# Patient Record
Sex: MALE | Race: WHITE | NOT HISPANIC OR LATINO | URBAN - METROPOLITAN AREA
[De-identification: names, ages, dates, MRNs, and addresses within clinical notes are randomized per-mention and may not be internally consistent; named-entity substitution may affect disease eponyms.]

---

## 2021-02-19 ENCOUNTER — PREPPED CHART (OUTPATIENT)
Dept: URBAN - METROPOLITAN AREA CLINIC 64 | Facility: CLINIC | Age: 61
End: 2021-02-19

## 2021-02-19 PROBLEM — H44.23 PROGRESSIVE HIGH MYOPIA: Noted: 2021-02-19

## 2021-02-19 PROBLEM — H43.813 POSTERIOR VITREOUS DETACHMENT: Noted: 2021-02-19

## 2021-02-19 PROBLEM — H43.12 VITREOUS HEMORRHAGE: Noted: 2021-02-19

## 2021-02-19 PROBLEM — H31.091 CHORIORETINAL SCARS, PERIPHERAL: Noted: 2021-02-19

## 2021-12-26 ENCOUNTER — NURSE TRIAGE (OUTPATIENT)
Dept: FAMILY MEDICINE | Facility: CLINIC | Age: 61
End: 2021-12-26

## 2021-12-26 ENCOUNTER — APPOINTMENT (OUTPATIENT)
Dept: LAB | Facility: URGENT CARE | Age: 61
End: 2021-12-26

## 2021-12-26 DIAGNOSIS — Z20.822 CONTACT WITH AND (SUSPECTED) EXPOSURE TO COVID-19: ICD-10-CM

## 2021-12-26 PROCEDURE — 87635 SARS-COV-2 COVID-19 AMP PRB: CPT | Performed by: FAMILY MEDICINE

## 2021-12-26 PROCEDURE — 99211 OFF/OP EST MAY X REQ PHY/QHP: CPT | Mod: LAB | Performed by: FAMILY MEDICINE

## 2021-12-26 PROCEDURE — U0005 INFEC AGEN DETEC AMPLI PROBE: HCPCS | Performed by: FAMILY MEDICINE

## 2021-12-26 NOTE — TELEPHONE ENCOUNTER
COVID-19 SCREENING ASSESSMENT     CRITERIA FOR TESTING  Is patient symptomatic: Yes: What symptoms are you experiencing?      ORDER QUESTIONS  Date of onset: 12/23/2021     OTHER QUESTIONS  Are you a [facility] employee? No  Have you had close contact with a lab confirmed case of COVID-19 in the last 14 days? no        Reason for Disposition  • Patient has COVID-19 symptoms per CDC guidelines.    Protocols used: COVID-19 TRIAGE PROTOCOL MONUMENT HEALTH

## 2021-12-27 ENCOUNTER — TELEPHONE - BILLABLE (OUTPATIENT)
Dept: URGENT CARE | Facility: URGENT CARE | Age: 61
End: 2021-12-27
Payer: COMMERCIAL

## 2021-12-27 ENCOUNTER — TELEPHONE (OUTPATIENT)
Dept: FAMILY MEDICINE | Facility: CLINIC | Age: 61
End: 2021-12-27
Payer: COMMERCIAL

## 2021-12-27 DIAGNOSIS — U07.1 COVID: Primary | ICD-10-CM

## 2021-12-27 PROBLEM — K22.70 BARRETT'S ESOPHAGUS: Status: ACTIVE | Noted: 2021-12-27

## 2021-12-27 PROBLEM — G47.00 INSOMNIA: Status: ACTIVE | Noted: 2018-04-24

## 2021-12-27 PROBLEM — G47.33 OBSTRUCTIVE SLEEP APNEA SYNDROME: Status: ACTIVE | Noted: 2018-09-27

## 2021-12-27 PROBLEM — E03.9 HYPOTHYROIDISM: Status: ACTIVE | Noted: 2018-09-27

## 2021-12-27 PROBLEM — R73.9 HYPERGLYCEMIA: Status: ACTIVE | Noted: 2020-11-11

## 2021-12-27 LAB — SARS-COV-2 RNA RESP QL NAA+PROBE: POSITIVE

## 2021-12-27 PROCEDURE — 99441 *INACTIVE DO NOT USE* PR PHYS/QHP TELEPHONE EVALUATION 5-10 MIN: CPT | Performed by: PHYSICIAN ASSISTANT

## 2021-12-27 RX ORDER — SILDENAFIL 100 MG/1
1 TABLET, FILM COATED ORAL AS NEEDED
COMMUNITY

## 2021-12-27 RX ORDER — ESOMEPRAZOLE MAGNESIUM 40 MG/1
1 CAPSULE, DELAYED RELEASE ORAL DAILY
COMMUNITY
Start: 2021-12-08

## 2021-12-27 RX ORDER — LEVOTHYROXINE SODIUM 112 UG/1
1 TABLET ORAL DAILY
COMMUNITY
Start: 2021-10-13

## 2021-12-27 NOTE — PROGRESS NOTES
Subjective   Per discussion with Alvarez Carpenter PA-C, Skip, has verbally consented to be treated via a telephone based visit: Yes. A total of 10 minutes were required for this telephone based visit.   Patient Location: Home  Provider Location: Clinic  Technology used by Provider: Phone    HPI  Skip Gregg is a 61 y.o. male who presents for COVID-19 infection inquiring about monoclonal antibody infusion.  Patient states symptoms began 12/24.  Tested positive for COVID-19 on 12/26.  He has not received COVID-19 vaccination.  Patient complains of low-grade temperature, chills, chest heaviness, sore throat, dry cough.  Patient states he has been taking Robitussin, ibuprofen, Tylenol for symptoms.  Patient has a history of hypothyroidism.  Height 511.5.  Weight 220 pounds.    The following have been reviewed and updated as appropriate in this visit:    No Known Allergies  Current Outpatient Medications   Medication Sig Dispense Refill   • sildenafiL (VIAGRA) 100 mg tablet Take 1 tablet by mouth as needed     • levothyroxine (SYNTHROID, LEVOTHROID) 112 mcg tablet Take 1 tablet by mouth daily     • esomeprazole (NexIUM) 40 mg capsule Take 1 capsule by mouth daily       No current facility-administered medications for this visit.     Past Medical History:   Diagnosis Date   • Back pain    • Disease of thyroid gland 2004   • GERD (gastroesophageal reflux disease)      Past Surgical History:   Procedure Laterality Date   • CATARACT EXTRACTION, BILATERAL Bilateral 2020     Family History   Problem Relation Age of Onset   • Hypertension Mother    • Diabetes type II Father      Social History     Occupational History   • Not on file   Tobacco Use   • Smoking status: Never Smoker   • Smokeless tobacco: Never Used   Vaping Use   • Vaping Use: Never used   Substance and Sexual Activity   • Alcohol use: Yes     Comment: 2/daily    • Drug use: Never   • Sexual activity: Yes     Partners: Female     Birth  control/protection: None   Social History Narrative   • Not on file       Review of Systems as noted in HPI.    Objective   Physical Exam  Patient's voice sounds strong.  Speaks in long sentences.    Skip Gregg has COVID with plans to initiate anti-SARS-COV-2 monoclonal antibody therapy via EUA.     The patient is NOT primarily admitted to the hospital, requiring oxygen or requiring oxygen greater than baseline requirement due to an active COVID-19 infection.    The patient has mild-moderate COVID-19 with a positive COVID-CoV-2 test and is within 10 days of symptom onset with high risk for progression to severe COVID-19 and/or hospitalization. Date of symptom onset 12/24.    The patient is unvaccinated  and has the following high-risk criteria meeting requirements for the EUA: Overweight (body mass index over 25).    The patient and or patient's parent(s)/caregiver(s) was NOT ABLE to be given the 'Fact Sheet for Patient and Parents/Caregivers', informed of the alternatives to receiving anti-SARS-COV-2 monoclonal antibody therapy or informed that anti-SARS-COV-2 monoclonal antibody therapy is an unapproved drug that is authorized for use under EUA (Emergency Use Authorization), as delay in administration of anti-SARS-COV-2 monoclonal antibody therapy would endanger the life of the patient. This information will be provided to the patient and/or patient's proxy as soon as practical after anti-SARS-COV-2 monoclonal antibody therapy is administered.     Casirivimab and imdevimab fact sheet for healthcare providers  Casirivimab and imdevimab fact sheet for patients and parents and/or caregivers (english)  Casirivimab and imdevimab fact sheet for patients and parents and/or caregivers (Maltese)    Bamlanivimab and etesevimab fact sheet for healthcare providers  Bamlanivimab and etesevimab fact sheet for patients and parents and/or caregivers (english)  Bamlanivimab and etesevimab fact sheet for patients and parents  and/or caregivers (Finnish)    Sotrovimab fact sheet for healthcare providers   Sotrovimab fact sheet for patients and parents and/or caregivers (english)  Sotrovimab fact sheet for patients and parents and/or caregivers (Finnish)    The patient does not have any known past adverse reaction(s) or hypersensitivity to anti-SARS-COV-2 monoclonal antibody therapy.    The patient  has agreed to treatment with anti-SARS-COV-2 monoclonal antibody therapy.    The patient will be monitored closely for adverse drug events. If concern for an adverse event occurs, pharmacy will be contacted and the event will be reported to the FDA.    Diagnoses and all orders for this visit:    COVID  -     COVID-19 Monoclonal Antibody Referral to Infusion Therapy         Alvarez Carpenter PA-C    A voice recognition program was used to aid in documentation of this record. Sometimes words are not printed exactly as they were spoken.  While efforts were made to carefully edit and correct inaccuracies, some areas may be present; please take these into context.  Please contact the provider if identified.

## 2021-12-27 NOTE — PROGRESS NOTES
"sx of covid started 12/24, positive for Covid 12/26  Symptoms: Chills, heavy chest, slight SOB, headaches, throat pain, dry cough, temps of 99.5-100  Taking: Robitussin DM, alt tylenol/ibuprofen    Height:5'11.5 \"  Weight: 220 lb     Not Vaccinated.   "

## 2021-12-27 NOTE — TELEPHONE ENCOUNTER
Patient call: Request Monoclonal Antibody Therapy for Covid Symptoms      Patient onset of covid symptoms:  12/24/2021  Patient tested positive on 12/26/2021     The following was reviewed with the patient:     1. Monoclonal antibody is FDA authorized as emergency use authorization.  This treatment is used to decrease Covid-19 symptoms and reduce risk of hospitalization.   2.   A provider must meet with you to educate, determine eligibility, and then order therapy if appropriate.  3. There are potential costs to you if your insurance company does not cover portions of the administration of the medicine.      Patient expressed understanding and agrees to be referred for appointment with provider to discuss eligibility.      Skip Gregg

## 2021-12-28 ENCOUNTER — HOSPITAL ENCOUNTER (OUTPATIENT)
Dept: INFUSION THERAPY | Facility: HOSPITAL | Age: 61
Discharge: HOME | End: 2021-12-28
Payer: COMMERCIAL

## 2021-12-28 VITALS
DIASTOLIC BLOOD PRESSURE: 80 MMHG | SYSTOLIC BLOOD PRESSURE: 129 MMHG | TEMPERATURE: 96.8 F | HEART RATE: 72 BPM | RESPIRATION RATE: 17 BRPM | OXYGEN SATURATION: 95 %

## 2021-12-28 DIAGNOSIS — U07.1 COVID-19: Primary | ICD-10-CM

## 2021-12-28 PROCEDURE — M0245 HC IV INFUSION, BAMLANIVIMAB AND ETESEVIMAB, INCLUDES INFUSION AND POST ADMINISTRATION MONITORING: HCPCS | Performed by: PHYSICIAN ASSISTANT

## 2021-12-28 PROCEDURE — 6360000200 HC RX 636 W HCPCS (ALT 250 FOR IP): Performed by: PHYSICIAN ASSISTANT

## 2021-12-28 RX ADMIN — SODIUM CHLORIDE 1400 MG: 9 INJECTION, SOLUTION INTRAVENOUS at 07:19

## 2021-12-31 ENCOUNTER — APPOINTMENT (OUTPATIENT)
Dept: LAB | Facility: URGENT CARE | Age: 61
End: 2021-12-31
Payer: COMMERCIAL

## 2021-12-31 DIAGNOSIS — Z20.822 CONTACT WITH AND (SUSPECTED) EXPOSURE TO COVID-19: ICD-10-CM

## 2021-12-31 LAB — SARS-COV-2 RNA RESP QL NAA+PROBE: POSITIVE

## 2021-12-31 PROCEDURE — U0005 INFEC AGEN DETEC AMPLI PROBE: HCPCS | Performed by: FAMILY MEDICINE

## 2021-12-31 PROCEDURE — C9803 HOPD COVID-19 SPEC COLLECT: HCPCS | Performed by: FAMILY MEDICINE

## 2021-12-31 PROCEDURE — 87635 SARS-COV-2 COVID-19 AMP PRB: CPT | Performed by: FAMILY MEDICINE

## 2022-04-22 ENCOUNTER — FOLLOW UP (OUTPATIENT)
Dept: URBAN - METROPOLITAN AREA CLINIC 64 | Facility: CLINIC | Age: 62
End: 2022-04-22

## 2022-04-22 DIAGNOSIS — H52.13: ICD-10-CM

## 2022-04-22 DIAGNOSIS — H33.311: ICD-10-CM

## 2022-04-22 DIAGNOSIS — H33.011: ICD-10-CM

## 2022-04-22 DIAGNOSIS — H43.813: ICD-10-CM

## 2022-04-22 DIAGNOSIS — H43.12: ICD-10-CM

## 2022-04-22 PROCEDURE — 92201 OPSCPY EXTND RTA DRAW UNI/BI: CPT

## 2022-04-22 PROCEDURE — 92134 CPTRZ OPH DX IMG PST SGM RTA: CPT

## 2022-04-22 PROCEDURE — 99215 OFFICE O/P EST HI 40 MIN: CPT | Mod: 57

## 2022-04-22 ASSESSMENT — TONOMETRY
OS_IOP_MMHG: 15
OD_IOP_MMHG: 16

## 2022-04-22 ASSESSMENT — VISUAL ACUITY
OD_SC: 20/20
OS_SC: 20/100

## 2022-04-23 ENCOUNTER — SURGERY/PROCEDURE (OUTPATIENT)
Dept: URBAN - METROPOLITAN AREA HOSPITAL 17 | Facility: HOSPITAL | Age: 62
End: 2022-04-23

## 2022-04-23 DIAGNOSIS — H33.311: ICD-10-CM

## 2022-04-23 DIAGNOSIS — H33.011: ICD-10-CM

## 2022-04-23 PROCEDURE — 67108 REPAIR DETACHED RETINA: CPT

## 2022-04-24 ENCOUNTER — 1 DAY POST-OP (OUTPATIENT)
Dept: URBAN - METROPOLITAN AREA CLINIC 67 | Facility: CLINIC | Age: 62
End: 2022-04-24

## 2022-04-24 DIAGNOSIS — H33.311: ICD-10-CM

## 2022-04-24 DIAGNOSIS — H33.011: ICD-10-CM

## 2022-04-24 DIAGNOSIS — H43.12: ICD-10-CM

## 2022-04-24 DIAGNOSIS — H52.13: ICD-10-CM

## 2022-04-24 DIAGNOSIS — Z98.890: ICD-10-CM

## 2022-04-24 DIAGNOSIS — H43.813: ICD-10-CM

## 2022-04-24 PROCEDURE — 99024 POSTOP FOLLOW-UP VISIT: CPT

## 2022-04-25 ASSESSMENT — TONOMETRY: OD_IOP_MMHG: 20

## 2022-04-27 ENCOUNTER — FOLLOW UP (OUTPATIENT)
Dept: URBAN - METROPOLITAN AREA CLINIC 80 | Facility: CLINIC | Age: 62
End: 2022-04-27

## 2022-04-27 DIAGNOSIS — H33.311: ICD-10-CM

## 2022-04-27 DIAGNOSIS — H43.812: ICD-10-CM

## 2022-04-27 DIAGNOSIS — H43.12: ICD-10-CM

## 2022-04-27 DIAGNOSIS — H52.13: ICD-10-CM

## 2022-04-27 DIAGNOSIS — Z98.890: ICD-10-CM

## 2022-04-27 PROCEDURE — 99024 POSTOP FOLLOW-UP VISIT: CPT

## 2022-04-27 ASSESSMENT — TONOMETRY
OS_IOP_MMHG: 17
OD_IOP_MMHG: 32
OS_IOP_MMHG: 21
OD_IOP_MMHG: 34
OD_IOP_MMHG: 29

## 2022-04-27 ASSESSMENT — VISUAL ACUITY
OS_SC: 20/100
OS_PH: 20/30-1
OS_SC: 20/70-1
OS_PH: 20/40
OD_SC: CF 4FT

## 2022-04-29 ENCOUNTER — FOLLOW UP (OUTPATIENT)
Dept: URBAN - METROPOLITAN AREA CLINIC 64 | Facility: CLINIC | Age: 62
End: 2022-04-29

## 2022-04-29 DIAGNOSIS — Z98.890: ICD-10-CM

## 2022-04-29 DIAGNOSIS — H33.311: ICD-10-CM

## 2022-04-29 PROCEDURE — 99024 POSTOP FOLLOW-UP VISIT: CPT

## 2022-04-29 ASSESSMENT — TONOMETRY: OD_IOP_MMHG: 20

## 2022-04-29 ASSESSMENT — VISUAL ACUITY: OD_SC: CF 2FT

## 2022-05-05 ENCOUNTER — POST-OP CHECK (OUTPATIENT)
Dept: URBAN - METROPOLITAN AREA CLINIC 64 | Facility: CLINIC | Age: 62
End: 2022-05-05

## 2022-05-05 DIAGNOSIS — Z98.890: ICD-10-CM

## 2022-05-05 DIAGNOSIS — H33.311: ICD-10-CM

## 2022-05-05 PROCEDURE — 99024 POSTOP FOLLOW-UP VISIT: CPT

## 2022-05-05 ASSESSMENT — VISUAL ACUITY: OD_SC: CF 2FT

## 2022-05-05 ASSESSMENT — TONOMETRY: OD_IOP_MMHG: 22

## 2022-05-19 ENCOUNTER — 2 WEEK POST-OP (OUTPATIENT)
Dept: URBAN - METROPOLITAN AREA CLINIC 64 | Facility: CLINIC | Age: 62
End: 2022-05-19

## 2022-05-19 DIAGNOSIS — Z98.890: ICD-10-CM

## 2022-05-19 DIAGNOSIS — H52.13: ICD-10-CM

## 2022-05-19 DIAGNOSIS — H33.311: ICD-10-CM

## 2022-05-19 PROCEDURE — 99024 POSTOP FOLLOW-UP VISIT: CPT

## 2022-05-19 PROCEDURE — 92134 CPTRZ OPH DX IMG PST SGM RTA: CPT

## 2022-05-19 ASSESSMENT — VISUAL ACUITY: OD_SC: CF 3FT

## 2022-05-19 ASSESSMENT — TONOMETRY: OD_IOP_MMHG: 15

## 2022-06-03 ENCOUNTER — 2 WEEK POST-OP (OUTPATIENT)
Dept: URBAN - METROPOLITAN AREA CLINIC 64 | Facility: CLINIC | Age: 62
End: 2022-06-03

## 2022-06-03 DIAGNOSIS — H52.13: ICD-10-CM

## 2022-06-03 DIAGNOSIS — H33.311: ICD-10-CM

## 2022-06-03 DIAGNOSIS — Z98.890: ICD-10-CM

## 2022-06-03 PROCEDURE — 92134 CPTRZ OPH DX IMG PST SGM RTA: CPT

## 2022-06-03 PROCEDURE — 99024 POSTOP FOLLOW-UP VISIT: CPT

## 2022-06-03 ASSESSMENT — VISUAL ACUITY: OD_SC: 20/20

## 2022-06-03 ASSESSMENT — TONOMETRY: OD_IOP_MMHG: 12

## 2022-06-24 ENCOUNTER — POST-OP CHECK (OUTPATIENT)
Dept: URBAN - METROPOLITAN AREA CLINIC 64 | Facility: CLINIC | Age: 62
End: 2022-06-24

## 2022-06-24 DIAGNOSIS — Z98.890: ICD-10-CM

## 2022-06-24 DIAGNOSIS — H33.311: ICD-10-CM

## 2022-06-24 PROCEDURE — 99024 POSTOP FOLLOW-UP VISIT: CPT

## 2022-06-24 ASSESSMENT — VISUAL ACUITY: OD_SC: 20/20

## 2022-06-24 ASSESSMENT — TONOMETRY: OD_IOP_MMHG: 16

## 2022-07-19 ENCOUNTER — FOLLOW UP (OUTPATIENT)
Dept: URBAN - METROPOLITAN AREA CLINIC 64 | Facility: CLINIC | Age: 62
End: 2022-07-19

## 2022-07-19 DIAGNOSIS — H33.311: ICD-10-CM

## 2022-07-19 DIAGNOSIS — H43.12: ICD-10-CM

## 2022-07-19 DIAGNOSIS — H43.812: ICD-10-CM

## 2022-07-19 DIAGNOSIS — Z96.1: ICD-10-CM

## 2022-07-19 DIAGNOSIS — H35.373: ICD-10-CM

## 2022-07-19 DIAGNOSIS — H52.13: ICD-10-CM

## 2022-07-19 DIAGNOSIS — Z98.890: ICD-10-CM

## 2022-07-19 PROCEDURE — 92134 CPTRZ OPH DX IMG PST SGM RTA: CPT

## 2022-07-19 PROCEDURE — 99024 POSTOP FOLLOW-UP VISIT: CPT

## 2022-07-19 ASSESSMENT — VISUAL ACUITY
OS_PH: 20/40+2
OS_SC: 20/70-2
OD_SC: 20/20

## 2022-07-19 ASSESSMENT — TONOMETRY
OD_IOP_MMHG: 17
OS_IOP_MMHG: 17

## 2022-10-18 ENCOUNTER — FOLLOW UP (OUTPATIENT)
Dept: URBAN - METROPOLITAN AREA CLINIC 64 | Facility: CLINIC | Age: 62
End: 2022-10-18

## 2022-10-18 DIAGNOSIS — H52.13: ICD-10-CM

## 2022-10-18 DIAGNOSIS — H35.373: ICD-10-CM

## 2022-10-18 DIAGNOSIS — Z98.890: ICD-10-CM

## 2022-10-18 DIAGNOSIS — H33.311: ICD-10-CM

## 2022-10-18 DIAGNOSIS — Z96.1: ICD-10-CM

## 2022-10-18 DIAGNOSIS — H43.812: ICD-10-CM

## 2022-10-18 DIAGNOSIS — H43.12: ICD-10-CM

## 2022-10-18 PROCEDURE — 92014 COMPRE OPH EXAM EST PT 1/>: CPT

## 2022-10-18 PROCEDURE — 92134 CPTRZ OPH DX IMG PST SGM RTA: CPT

## 2022-10-18 PROCEDURE — 92201 OPSCPY EXTND RTA DRAW UNI/BI: CPT

## 2022-10-18 ASSESSMENT — VISUAL ACUITY
OS_PH: 20/40-2
OD_SC: 20/20-1
OS_SC: 20/100

## 2022-10-18 ASSESSMENT — TONOMETRY
OD_IOP_MMHG: 11
OS_IOP_MMHG: 12

## 2023-02-21 ENCOUNTER — FOLLOW UP (OUTPATIENT)
Dept: URBAN - METROPOLITAN AREA CLINIC 64 | Facility: CLINIC | Age: 63
End: 2023-02-21

## 2023-02-21 DIAGNOSIS — Z96.1: ICD-10-CM

## 2023-02-21 DIAGNOSIS — H33.311: ICD-10-CM

## 2023-02-21 DIAGNOSIS — Z98.890: ICD-10-CM

## 2023-02-21 DIAGNOSIS — H43.12: ICD-10-CM

## 2023-02-21 DIAGNOSIS — H43.812: ICD-10-CM

## 2023-02-21 DIAGNOSIS — H52.13: ICD-10-CM

## 2023-02-21 DIAGNOSIS — H35.373: ICD-10-CM

## 2023-02-21 PROCEDURE — 92201 OPSCPY EXTND RTA DRAW UNI/BI: CPT

## 2023-02-21 PROCEDURE — 92014 COMPRE OPH EXAM EST PT 1/>: CPT

## 2023-02-21 PROCEDURE — 92134 CPTRZ OPH DX IMG PST SGM RTA: CPT

## 2023-02-21 ASSESSMENT — VISUAL ACUITY
OS_PH: 20/40-2
OD_SC: 20/20
OS_SC: 20/200

## 2023-02-21 ASSESSMENT — TONOMETRY
OD_IOP_MMHG: 18
OS_IOP_MMHG: 17

## 2024-04-19 ENCOUNTER — FOLLOW UP (OUTPATIENT)
Dept: URBAN - METROPOLITAN AREA CLINIC 64 | Facility: CLINIC | Age: 64
End: 2024-04-19

## 2024-04-19 DIAGNOSIS — H43.812: ICD-10-CM

## 2024-04-19 DIAGNOSIS — Z96.1: ICD-10-CM

## 2024-04-19 DIAGNOSIS — H33.011: ICD-10-CM

## 2024-04-19 DIAGNOSIS — H52.13: ICD-10-CM

## 2024-04-19 DIAGNOSIS — H35.373: ICD-10-CM

## 2024-04-19 DIAGNOSIS — H43.392: ICD-10-CM

## 2024-04-19 PROCEDURE — 92134 CPTRZ OPH DX IMG PST SGM RTA: CPT

## 2024-04-19 PROCEDURE — 92201 OPSCPY EXTND RTA DRAW UNI/BI: CPT

## 2024-04-19 PROCEDURE — 92014 COMPRE OPH EXAM EST PT 1/>: CPT

## 2024-04-19 ASSESSMENT — TONOMETRY
OS_IOP_MMHG: 12
OD_IOP_MMHG: 14

## 2024-04-19 ASSESSMENT — VISUAL ACUITY
OS_PH: 20/25-1
OS_SC: 20/70
OD_SC: 20/20

## (undated) RX ORDER — BRIMONIDINE TARTRATE 2 MG/MG: 1 SOLUTION/ DROPS OPHTHALMIC TWICE A DAY

## (undated) RX ORDER — PREDNISOLONE ACETATE 10 MG/ML: 1 SUSPENSION/ DROPS OPHTHALMIC

## (undated) RX ORDER — DORZOLAMIDE HYDROCHLORIDE TIMOLOL MALEATE 20; 5 MG/ML; MG/ML: 1 SOLUTION/ DROPS OPHTHALMIC TWICE A DAY

## (undated) RX ORDER — OFLOXACIN 3 MG/ML: 1 SOLUTION OPHTHALMIC